# Patient Record
Sex: FEMALE | Race: WHITE | NOT HISPANIC OR LATINO | Employment: UNEMPLOYED | URBAN - METROPOLITAN AREA
[De-identification: names, ages, dates, MRNs, and addresses within clinical notes are randomized per-mention and may not be internally consistent; named-entity substitution may affect disease eponyms.]

---

## 2020-01-23 ENCOUNTER — OFFICE VISIT (OUTPATIENT)
Dept: OBGYN CLINIC | Facility: CLINIC | Age: 50
End: 2020-01-23

## 2020-01-23 VITALS
HEIGHT: 68 IN | DIASTOLIC BLOOD PRESSURE: 82 MMHG | HEART RATE: 91 BPM | WEIGHT: 190 LBS | SYSTOLIC BLOOD PRESSURE: 126 MMHG | BODY MASS INDEX: 28.79 KG/M2

## 2020-01-23 DIAGNOSIS — N95.1 MENOPAUSE SYNDROME: ICD-10-CM

## 2020-01-23 DIAGNOSIS — Z12.39 BREAST CANCER SCREENING: ICD-10-CM

## 2020-01-23 DIAGNOSIS — Z01.419 WOMEN'S ANNUAL ROUTINE GYNECOLOGICAL EXAMINATION: Primary | ICD-10-CM

## 2020-01-23 PROCEDURE — 87624 HPV HI-RISK TYP POOLED RSLT: CPT | Performed by: NURSE PRACTITIONER

## 2020-01-23 PROCEDURE — 99386 PREV VISIT NEW AGE 40-64: CPT | Performed by: NURSE PRACTITIONER

## 2020-01-23 PROCEDURE — G0145 SCR C/V CYTO,THINLAYER,RESCR: HCPCS | Performed by: NURSE PRACTITIONER

## 2020-01-23 RX ORDER — CYCLOBENZAPRINE HCL 5 MG
10 TABLET ORAL 3 TIMES DAILY PRN
COMMUNITY
End: 2020-02-10 | Stop reason: SDUPTHER

## 2020-01-23 RX ORDER — ESTRADIOL 2 MG/1
2 TABLET ORAL DAILY
COMMUNITY
End: 2020-01-23 | Stop reason: SDUPTHER

## 2020-01-23 RX ORDER — ESTRADIOL 2 MG/1
2 TABLET ORAL DAILY
Qty: 30 TABLET | Refills: 0 | Status: SHIPPED | OUTPATIENT
Start: 2020-01-23 | End: 2020-03-02 | Stop reason: SDUPTHER

## 2020-01-23 RX ORDER — WARFARIN SODIUM 3 MG/1
TABLET ORAL
COMMUNITY
End: 2020-03-17 | Stop reason: SDUPTHER

## 2020-01-23 RX ORDER — POTASSIUM CHLORIDE 7.45 MG/ML
10 INJECTION INTRAVENOUS ONCE
COMMUNITY
End: 2020-02-10 | Stop reason: CLARIF

## 2020-01-23 RX ORDER — PRAVASTATIN SODIUM 40 MG
40 TABLET ORAL DAILY
COMMUNITY
End: 2020-03-06 | Stop reason: SINTOL

## 2020-01-23 RX ORDER — CARVEDILOL 3.12 MG/1
3.12 TABLET ORAL 2 TIMES DAILY WITH MEALS
COMMUNITY
End: 2020-02-10 | Stop reason: SDUPTHER

## 2020-01-23 RX ORDER — FUROSEMIDE 40 MG/1
40 TABLET ORAL 2 TIMES DAILY
COMMUNITY
End: 2020-02-10 | Stop reason: SDUPTHER

## 2020-01-23 NOTE — PATIENT INSTRUCTIONS
Mammogram   WHAT YOU NEED TO KNOW:   What do I need to know about a mammogram?  A mammogram is an x-ray of your breasts to screen for breast cancer  Experts recommend mammograms every 2 years starting at age 48 years  You may need a mammogram at age 52 years or younger if you have an increased risk for breast cancer  Talk to your healthcare provider about when you should start having mammograms and how often you need them  How do I prepare for a mammogram?   · Do not use deodorant, powder, lotion, or perfume  These products may cause particles to appear on your mammogram      · Wear a 2-piece outfit  · If your breasts are tender before your monthly period, do not have a mammogram during this time  Schedule your mammogram to be done 1 week after your period ends  · If you are breastfeeding, express as much milk as possible before the mammogram     · Bring a list of the dates and places of your past mammograms and other breast tests or treatments  What will happen during a mammogram?  A top view and a side view x-ray are usually done for each breast  Tell healthcare providers if you have breast implants or breast problems before you have your mammogram  You may need extra x-rays of each breast   · You will be given a hospital gown  Take off your clothes from the waist up  Wear the hospital gown so that it opens in the front  · You will sit or stand next to a small x-ray machine  The healthcare provider will help you place one of your breasts on the x-ray plate  Your arm and breast will be moved until your breast is in the correct position  · Your breast will be gently pressed between 2 plastic plates for a few seconds while the x-ray is taken  This may be uncomfortable  · You will be asked to hold your breath while the x-ray is taken  Another x-ray will be taken of the same breast after the position of the x-ray machine has been changed  · Your other breast will be x-rayed the same way    What will happen after my mammogram?  Your breasts may feel tender for a short while after the mammogram  You may resume your regular activities  Ask your healthcare provider when you should receive the results of your mammogram   What are the risks of a mammogram?  You will be exposed to a small amount of radiation  Some breast cancers may not show up on mammograms  When should I contact my healthcare provider? · You cannot make your appointment on time  · You do not receive your results when expected  · You have questions or concerns about the mammogram   CARE AGREEMENT:   You have the right to help plan your care  Learn about your health condition and how it may be treated  Discuss treatment options with your caregivers to decide what care you want to receive  You always have the right to refuse treatment  The above information is an  only  It is not intended as medical advice for individual conditions or treatments  Talk to your doctor, nurse or pharmacist before following any medical regimen to see if it is safe and effective for you  © 2017 [facility] Information is for End User's use only and may not be sold, redistributed or otherwise used for commercial purposes  All illustrations and images included in CareNotes® are the copyrighted property of [employer]  or Ezequiel Helm  Wellness Visit for Adults   WHAT YOU NEED TO KNOW:   What is a wellness visit? A wellness visit is when you see your healthcare provider to get screened for health problems  You can also get advice on how to stay healthy  Write down your questions so you remember to ask them  Ask your healthcare provider how often you should have a wellness visit  What happens at a wellness visit? Your healthcare provider will ask about your health, and your family history of health problems  This includes high blood pressure, heart disease, and cancer   He or she will ask if you have symptoms that concern you, if you smoke, and about your mood  You may also be asked about your intake of medicines, supplements, food, and alcohol  Any of the following may be done:  · Your weight  will be checked  Your height may also be checked so your body mass index (BMI) can be calculated  Your BMI shows if you are at a healthy weight  · Your blood pressure  and heart rate will be checked  Your temperature may also be checked  · Blood and urine tests  may be done  Blood tests may be done to check your cholesterol levels  Abnormal cholesterol levels increase your risk for heart disease and stroke  You may also need a blood or urine test to check for diabetes if you are at increased risk  Urine tests may be done to look for signs of an infection or kidney disease  · A physical exam  includes checking your heartbeat and lungs with a stethoscope  Your healthcare provider may also check your skin to look for sun damage  · Screening tests  may be recommended  A screening test is done to check for diseases that may not cause symptoms  The screening tests you may need depend on your age, gender, family history, and lifestyle habits  For example, colorectal screening may be recommended if you are 48years old or older  What screening tests do I need if I am a woman? · A Pap smear  is used to screen for cervical cancer  Pap smears are usually done every 3 to 5 years depending on your age  You may need them more often if you have had abnormal Pap smear test results in the past  Ask your healthcare provider how often you should have a Pap smear  · A mammogram  is an x-ray of your breasts to screen for breast cancer  Experts recommend mammograms every 2 years starting at age 48 years  You may need a mammogram at age 52 years or younger if you have an increased risk for breast cancer  Talk to your healthcare provider about when you should start having mammograms and how often you need them    What vaccines might I need? · Get an influenza vaccine  every year  The influenza vaccine protects you from the flu  Several types of viruses cause the flu  The viruses change over time, so new vaccines are made each year  · Get a tetanus-diphtheria (Td) booster vaccine  every 10 years  This vaccine protects you against tetanus and diphtheria  Tetanus is a severe infection that may cause painful muscle spasms and lockjaw  Diphtheria is a severe bacterial infection that causes a thick covering in the back of your mouth and throat  · Get a human papillomavirus (HPV) vaccine  if you are female and aged 23 to 32 or male 23 to 24 and never received it  This vaccine protects you from HPV infection  HPV is the most common infection spread by sexual contact  HPV may also cause vaginal, penile, and anal cancers  · Get a pneumococcal vaccine  if you are aged 72 years or older  The pneumococcal vaccine is an injection given to protect you from pneumococcal disease  Pneumococcal disease is an infection caused by pneumococcal bacteria  The infection may cause pneumonia, meningitis, or an ear infection  · Get a shingles vaccine  if you are aged 61 or older, even if you have had shingles before  The shingles vaccine is an injection to protect you from the varicella-zoster virus  This is the same virus that causes chickenpox  Shingles is a painful rash that develops in people who had chickenpox or have been exposed to the virus  How can I eat healthy? My Plate is a model for planning healthy meals  It shows the types and amounts of foods that should go on your plate  Fruits and vegetables make up about half of your plate, and grains and protein make up the other half  A serving of dairy is included on the side of your plate  The amount of calories and serving sizes you need depends on your age, gender, weight, and height   Examples of healthy foods are listed below:  · Eat a variety of vegetables  such as dark green, red, and orange vegetables  You can also include canned vegetables low in sodium (salt) and frozen vegetables without added butter or sauces  · Eat a variety of fresh fruits , canned fruit in 100% juice, frozen fruit, and dried fruit  · Include whole grains  At least half of the grains you eat should be whole grains  Examples include whole-wheat bread, wheat pasta, brown rice, and whole-grain cereals such as oatmeal     · Eat a variety of protein foods such as seafood (fish and shellfish), lean meat, and poultry without skin (turkey and chicken)  Examples of lean meats include pork leg, shoulder, or tenderloin, and beef round, sirloin, tenderloin, and extra lean ground beef  Other protein foods include eggs and egg substitutes, beans, peas, soy products, nuts, and seeds  · Choose low-fat dairy products such as skim or 1% milk or low-fat yogurt, cheese, and cottage cheese  · Limit unhealthy fats  such as butter, hard margarine, and shortening  How much exercise do I need? Exercise at least 30 minutes per day on most days of the week  Some examples of exercise include walking, biking, dancing, and swimming  You can also fit in more physical activity by taking the stairs instead of the elevator or parking farther away from stores  Include muscle strengthening activities 2 days each week  Regular exercise provides many health benefits  It helps you manage your weight, and decreases your risk for type 2 diabetes, heart disease, stroke, and high blood pressure  Exercise can also help improve your mood  Ask your healthcare provider about the best exercise plan for you  What are some general health and safety guidelines I should follow? · Do not smoke  Nicotine and other chemicals in cigarettes and cigars can cause lung damage  Ask your healthcare provider for information if you currently smoke and need help to quit  E-cigarettes or smokeless tobacco still contain nicotine   Talk to your healthcare provider before you use these products  · Limit alcohol  A drink of alcohol is 12 ounces of beer, 5 ounces of wine, or 1½ ounces of liquor  · Lose weight, if needed  Being overweight increases your risk of certain health conditions  These include heart disease, high blood pressure, type 2 diabetes, and certain types of cancer  · Protect your skin  Do not sunbathe or use tanning beds  Use sunscreen with a SPF 15 or higher  Apply sunscreen at least 15 minutes before you go outside  Reapply sunscreen every 2 hours  Wear protective clothing, hats, and sunglasses when you are outside  · Drive safely  Always wear your seatbelt  Make sure everyone in your car wears a seatbelt  A seatbelt can save your life if you are in an accident  Do not use your cell phone when you are driving  This could distract you and cause an accident  Pull over if you need to make a call or send a text message  · Practice safe sex  Use latex condoms if are sexually active and have more than one partner  Your healthcare provider may recommend screening tests for sexually transmitted infections (STIs)  · Wear helmets, lifejackets, and protective gear  Always wear a helmet when you ride a bike or motorcycle, go skiing, or play sports that could cause a head injury  Wear protective equipment when you play sports  Wear a lifejacket when you are on a boat or doing water sports  CARE AGREEMENT:   You have the right to help plan your care  Learn about your health condition and how it may be treated  Discuss treatment options with your caregivers to decide what care you want to receive  You always have the right to refuse treatment  The above information is an  only  It is not intended as medical advice for individual conditions or treatments  Talk to your doctor, nurse or pharmacist before following any medical regimen to see if it is safe and effective for you    © 2017 [address] [employer] Information is for End User's use only and may not be sold, redistributed or otherwise used for commercial purposes  All illustrations and images included in CareNotes® are the copyrighted property of [employer]  or Ezequiel Helm  Breast Self Exam for Women   WHAT YOU NEED TO KNOW:   What is a breast self-exam (BSE)? A BSE is a way to check your breasts for lumps and other changes  Regular BSEs can help you know how your breasts normally look and feel  Most breast lumps or changes are not cancer, but you should always have them checked by a healthcare provider  Your healthcare provider can also watch you do a BSE and can tell you if you are doing your BSE correctly  Why should I do a BSE? Breast cancer is the most common type of cancer in women  Even if you have mammograms, you may still want to do a BSE regularly  If you know how your breasts normally feel and look, it may help you know when to contact your healthcare provider  Mammograms can miss some cancers  You may find a lump during a BSE that did not show up on your mammogram   When should I do a BSE? Edgar your calendar to help you remember to do BSE on a regular schedule  One easy way to remember to do a BSE is to do the exam on the same day of each month  If you have periods, you may want to do your BSE 1 week after your period ends  This is the time when your breasts may be the least swollen, lumpy, or tender  You can do regular BSEs even if you are breastfeeding or have breast implants  How should I do a BSE? · Look at your breasts in a mirror  Look at the size and shape of each breast and nipple  Check for swelling, lumps, dimpling, scaly skin, or other skin changes  Look for nipple changes, such as a nipple that is painful or beginning to pull inward  Gently squeeze both nipples and check to see if fluid (that is not breast milk) comes out of them  If you find any of these or other breast changes, contact your healthcare provider   Check your breasts while you sit or  the following 3 positions:    [facility] your arms down at your sides  ¨ Raise your hands and join them behind your head  ¨ Put firm pressure with your hands on your hips  Bend slightly forward while you look at your breasts in the mirror  · Lie down and feel your breasts  When you lie down, your breast tissue spreads out evenly over your chest  This makes it easier for you to feel for lumps and anything that may not be normal for your breasts  Do a BSE on one breast at a time  ¨ Place a small pillow or towel under your left shoulder  Put your left arm behind your head  ¨ Use the 3 middle fingers of your right hand  Use your fingertip pads, on the top of your fingers  Your fingertip pad is the most sensitive part of your finger  ¨ Use small circles to feel your breast tissue  Use your fingertip pads to make dime-sized, overlapping circles on your breast and armpits  Use light, medium, and firm pressure  First, press lightly  Second, press with medium pressure to feel a little deeper into the breast  Last, use firm pressure to feel deep within your breast     ¨ Examine your entire breast area  Examine the breast area from above the breast to below the breast where you feel only ribs  Make small circles with your fingertips, starting in the middle of your armpit  Make circles going up and down the breast area  Continue toward your breast and all the way across it  Examine the area from your armpit all the way over to the middle of your chest (breastbone)  Stop at the middle of your chest     ¨ Move the pillow or towel to your right shoulder, and put your right arm behind your head  Use the 3 fingertip pads of your left hand, and repeat the above steps to do a BSE on your right breast        What else can I do to check for breast problems or cancer? Some experts suggest that women 36years of age or older should have a mammogram every year   Other experts suggest that women between the ages of 48and 76years old should have a mammogram every 2 years  Talk to your healthcare provider about when you should have a mammogram   When should I contact my healthcare provider? · You find any lumps or changes in your breasts  · You have breast pain or fluid coming from your nipples  · You have questions or concerns or concerns about your condition or care  CARE AGREEMENT:   You have the right to help plan your care  Learn about your health condition and how it may be treated  Discuss treatment options with your caregivers to decide what care you want to receive  You always have the right to refuse treatment  The above information is an  only  It is not intended as medical advice for individual conditions or treatments  Talk to your doctor, nurse or pharmacist before following any medical regimen to see if it is safe and effective for you  © 2017 [address] [employer] Information is for End User's use only and may not be sold, redistributed or otherwise used for commercial purposes  All illustrations and images included in CareNotes® are the copyrighted property of A [employer] A M [employer] Inc  or Ezequiel Helm

## 2020-01-23 NOTE — PROGRESS NOTES
Subjective      Shani Malik is a 52 y o  female who presents for annual exam   Status post hysterectomy 2016  Patient states she had hysterectomy for cancer unsure if it was for uterine or cervical cancer  Last mammogram 2018  Denies history of abnormal mammograms  Mammogram ordered today  The patient has no complaints today  The patient is not currently sexually active  The patient is taking hormone replacement therapy  Patient denies post-menopausal vaginal bleeding    The patient wears seatbelts: yes  The patient participates in regular exercise: no  The patient reports that there is not domestic violence in her life  Menstrual History:  OB History        4    Para   4    Term   4            AB        Living   4       SAB        TAB        Ectopic        Multiple        Live Births                    Menarche age: 15  No LMP recorded  Patient has had a hysterectomy  Period Cycle (Days): (hysterectomy 2016 abnormal bleeding, fibroids )    The following portions of the patient's history were reviewed and updated as appropriate: allergies, current medications, past family history, past medical history, past social history, past surgical history and problem list     Review of Systems  Pertinent items are noted in HPI       Objective      /82   Pulse 91   Ht 5' 8" (1 727 m)   Wt 86 2 kg (190 lb)   BMI 28 89 kg/m²     General:   alert and oriented, in no acute distress and alert   Heart: regular rate and rhythm, S1, S2 normal, no murmur, click, rub or gallop   Lungs: clear to auscultation bilaterally   Abdomen: soft, non-tender, without masses or organomegaly, nondistended and normal bowel sounds   Vulva: normal, Bartholin's, Urethra, [place]'s normal, female escutcheon   Vagina: normal mucosa, normal discharge, no palpable nodules   Cervix: surgically absent   Uterus: surgically absent   Adnexa: no mass, fullness, tenderness   Breast: non-tender, no palpable masses, no nipple discharge, no skin changes bilaterally     Assessment/Plan     Diagnoses and all orders for this visit:    Women's annual routine gynecological examination  -     Liquid-based pap, screening    Breast cancer screening  -     Mammo screening bilateral w 3d & cad; Future    Menopause syndrome  -     estradiol (ESTRACE) 2 MG tablet; Take 1 tablet (2 mg total) by mouth daily    Other orders  -     Discontinue: estradiol (ESTRACE) 2 MG tablet; Take 2 mg by mouth daily  -     pravastatin (PRAVACHOL) 40 mg tablet; Take 40 mg by mouth daily  -     cyclobenzaprine (FLEXERIL) 5 mg tablet; Take 10 mg by mouth 3 (three) times a day as needed for muscle spasms  -     carvedilol (COREG) 3 125 mg tablet; Take 3 125 mg by mouth 2 (two) times a day with meals  -     potassium chloride 10 mEq; Infuse 10 mEq into a venous catheter once  -     furosemide (LASIX) 40 mg tablet; Take 40 mg by mouth 2 (two) times a day  -     warfarin (COUMADIN) 3 mg tablet; Take by mouth daily        Encouraged healthy diet, exercise and lifestyle  Encouraged smoking cessation  Reviewed with patient will only refill estradiol for 1 month until we receive clarification regarding hysterectomy  Reviewed with patient if hysterectomy was indeed done for cancer will not fill estradiol will discuss other options for treatment of menopausal symptoms  Patient states she is not interested in any other medications  Will call with results    Care gap-     --records requested for clarification of hysterectomy 8/2016     VBI-  Tobacco Cessation Counseling: Tobacco cessation counseling and education was provided  The patient is sincerely urged to quit consumption of tobacco  She is not ready to quit tobacco  The numerous health risks of tobacco consumption were discussed     BMI Counseling: Body mass index is 28 89 kg/m²   The BMI is above normal  Nutrition recommendations include reducing portion sizes, 3-5 servings of fruits/vegetables daily and reducing fast food intake  Exercise recommendations include moderate aerobic physical activity for 150 minutes/week, exercising 3-5 times per week and strength training exercises

## 2020-01-24 LAB
HPV HR 12 DNA CVX QL NAA+PROBE: NEGATIVE
HPV16 DNA CVX QL NAA+PROBE: NEGATIVE
HPV18 DNA CVX QL NAA+PROBE: NEGATIVE

## 2020-01-27 ENCOUNTER — VBI (OUTPATIENT)
Dept: OBGYN CLINIC | Facility: CLINIC | Age: 50
End: 2020-01-27

## 2020-01-27 NOTE — LETTER
Procedure Request Form: Hysterectomy Report      Date Requested: 20  Patient: Bartholome Began  Patient : 1970   Referring Provider: Dr García Coral, MD      Date of Procedure ______________________________       The above patient has informed us that they have had a hysterectomy performed  at your facility  Please complete   this form and attach all corresponding procedure reports/results  Comments __________________________________________________________  ____________________________________________________________________  ____________________________________________________________________  ____________________________________________________________________    Facility Completing Procedure _________________________________________    Form Completed By (print name) _______________________________________      Signature __________________________________________________________      These reports are needed for  compliance    Please fax this completed form and a copy of the procedure report to our office as soon as possible to [phone] gustabo Morillo: Phone [phone]    We thank you for your assistance in treating our mutual patient     (sent to [facility])

## 2020-01-27 NOTE — TELEPHONE ENCOUNTER
Emerson Segura, [address] [place] [address]             01/23/20 6:11 PM     Hello, our patient Jennifer Salvador has had hysterectomy by Dr iVlma Ramires in [place] or [address] [place] [address] possibly called [facility] date of service near 8/2016         Thank you,   Clint Huerta 6      Could not locate a signed release of information authorization in chart  Search yeilded; No results for Dr Vilma Ramires ; [facility]  Call [phone]  01/27/2020 10:10 AM Phone Swathi Monet at [facility] (Provider) [phone] Remove   Call Complete - Must request all medical records via mail or fax  Fax number [phone]        By Nichole Villa MA        Faxed to [facility] [phone] JMPRBYRU XCQMZ, MA01/27/20 10:17 AM     Received LAURA surgical report, Nichole Villa MA 01/29/20 12:03 PM

## 2020-01-29 LAB
LAB AP GYN PRIMARY INTERPRETATION: NORMAL
Lab: NORMAL

## 2020-02-10 ENCOUNTER — OFFICE VISIT (OUTPATIENT)
Dept: FAMILY MEDICINE CLINIC | Facility: CLINIC | Age: 50
End: 2020-02-10

## 2020-02-10 ENCOUNTER — HOSPITAL ENCOUNTER (OUTPATIENT)
Dept: RADIOLOGY | Facility: HOSPITAL | Age: 50
Discharge: HOME | End: 2020-02-10
Payer: COMMERCIAL

## 2020-02-10 ENCOUNTER — TRANSCRIBE ORDERS (OUTPATIENT)
Dept: LAB | Facility: CLINIC | Age: 50
End: 2020-02-10

## 2020-02-10 VITALS
DIASTOLIC BLOOD PRESSURE: 80 MMHG | HEART RATE: 78 BPM | WEIGHT: 194 LBS | TEMPERATURE: 97.8 F | BODY MASS INDEX: 27.77 KG/M2 | RESPIRATION RATE: 18 BRPM | SYSTOLIC BLOOD PRESSURE: 130 MMHG | HEIGHT: 70 IN

## 2020-02-10 DIAGNOSIS — Z95.2 H/O MITRAL VALVE REPLACEMENT WITH MECHANICAL VALVE: ICD-10-CM

## 2020-02-10 DIAGNOSIS — M54.2 ACUTE NECK PAIN: ICD-10-CM

## 2020-02-10 DIAGNOSIS — Z00.00 ANNUAL PHYSICAL EXAM: Primary | ICD-10-CM

## 2020-02-10 DIAGNOSIS — G93.0 BRAIN CYST: ICD-10-CM

## 2020-02-10 DIAGNOSIS — J44.9 CHRONIC OBSTRUCTIVE PULMONARY DISEASE, UNSPECIFIED COPD TYPE (HCC): ICD-10-CM

## 2020-02-10 DIAGNOSIS — E78.49 OTHER HYPERLIPIDEMIA: ICD-10-CM

## 2020-02-10 DIAGNOSIS — Z11.4 SCREENING FOR HIV (HUMAN IMMUNODEFICIENCY VIRUS): ICD-10-CM

## 2020-02-10 PROBLEM — G43.109 MIGRAINE WITH AURA AND WITHOUT STATUS MIGRAINOSUS, NOT INTRACTABLE: Status: ACTIVE | Noted: 2020-02-10

## 2020-02-10 PROCEDURE — 99386 PREV VISIT NEW AGE 40-64: CPT | Performed by: FAMILY MEDICINE

## 2020-02-10 PROCEDURE — 72050 X-RAY EXAM NECK SPINE 4/5VWS: CPT

## 2020-02-10 RX ORDER — CYCLOBENZAPRINE HCL 5 MG
10 TABLET ORAL 3 TIMES DAILY PRN
Qty: 90 TABLET | Refills: 1 | Status: SHIPPED | OUTPATIENT
Start: 2020-02-10

## 2020-02-10 RX ORDER — POTASSIUM CHLORIDE 750 MG/1
10 CAPSULE, EXTENDED RELEASE ORAL DAILY
Qty: 30 CAPSULE | Refills: 3 | Status: SHIPPED | OUTPATIENT
Start: 2020-02-10

## 2020-02-10 RX ORDER — ASPIRIN 81 MG/1
81 TABLET, CHEWABLE ORAL DAILY
COMMUNITY

## 2020-02-10 RX ORDER — CARVEDILOL 3.12 MG/1
3.12 TABLET ORAL 2 TIMES DAILY WITH MEALS
Qty: 60 TABLET | Refills: 3 | Status: SHIPPED | OUTPATIENT
Start: 2020-02-10

## 2020-02-10 RX ORDER — FUROSEMIDE 40 MG/1
40 TABLET ORAL 2 TIMES DAILY
Qty: 60 TABLET | Refills: 3 | Status: SHIPPED | OUTPATIENT
Start: 2020-02-10

## 2020-02-10 NOTE — ASSESSMENT & PLAN NOTE
· Replacement occurred on 10/12/2018  · Surgery occurred in [place]  · According to patient will need lifelong anticoagulation  · Currently on aspirin 81mg daily and coumadin 3mg 6 days a week  · INR most recently 2 7 on 12/13/2019  · Patient also has history of orthopnea and is currently on lasix 40mg bid  · No available echo results  · Repeat INR and referral for cardiology given  · Patient will need to sign record release form to obtain previous records if unable to obtain will repeat echo to evaluate for EF and valve status

## 2020-02-10 NOTE — PATIENT INSTRUCTIONS

## 2020-02-10 NOTE — PROGRESS NOTES
[facility]    NAME: Minh Zuleta  AGE: 52 y o  SEX: female  : 1970     DATE: 2020     Assessment and Plan:     Problem List Items Addressed This Visit        Respiratory    COPD (chronic obstructive pulmonary disease) ([address] )     Current daily 1/2 pack smoker  Denies recent exacerbations  Previously diagnosed in [place]  No available documentation or medical records  RTC for PFT testing with consideration of referral to pulmonology  Nervous and Auditory    Brain cyst     · Followed with neurology back in [place]  · Found to have brain cyst that has been stable  · Transient episodes of cervical muscles spasms down left arm, left sided facial numbness without droop, and left upper and lower extremity paralysis  · Spasms last for about 15-20 minutes  · Takes flexeril 10mg TID which helps  · Patient states a history ofC4-5 disc herniation with MRI's performed in [place]  · Repeat cervical XR  · Referral for neurology given to establish care  · Will need records released from [place] for further information            Other    H/O mitral valve replacement with mechanical valve     · Replacement occurred on 10/12/2018  · Surgery occurred in [place]  · According to patient will need lifelong anticoagulation  · Currently on aspirin 81mg daily and coumadin 3mg 6 days a week  · INR most recently 2 7 on 2019  · Patient also has history of orthopnea and is currently on lasix 40mg bid  · No available echo results  · Repeat INR and referral for cardiology given  · Patient will need to sign record release form to obtain previous records if unable to obtain will repeat echo to evaluate for EF and valve status           Relevant Medications    carvedilol (COREG) 3 125 mg tablet    furosemide (LASIX) 40 mg tablet    potassium chloride (MICRO-K) 10 MEQ CR capsule    Other Relevant Orders    Protime-INR    Ambulatory referral to Cardiology    Other hyperlipidemia     Currently on Pravastatin 40mg daily  No recent records of lipid panel available           Relevant Orders    Lipid panel    Comprehensive metabolic panel      Other Visit Diagnoses     Annual physical exam    -  Primary    Screening for HIV (human immunodeficiency virus)        Relevant Orders    HIV 1/2 AG-AB combo    Acute neck pain        Relevant Medications    cyclobenzaprine (FLEXERIL) 5 mg tablet    Other Relevant Orders    Ambulatory referral to Neurology    XR spine cervical complete 4 or 5 vw non injury          Immunizations and preventive care screenings were discussed with patient today  Appropriate education was printed on patient's after visit summary  Counseling:  Alcohol/drug use: discussed moderation in alcohol intake, the recommendations for healthy alcohol use, and avoidance of illicit drug use  Dental Health: discussed importance of regular tooth brushing, flossing, and dental visits  Injury prevention: discussed safety/seat belts, safety helmets, smoke detectors, carbon dioxide detectors, and smoking near bedding or upholstery  Sexual health: discussed sexually transmitted diseases, partner selection, use of condoms, avoidance of unintended pregnancy, and contraceptive alternatives  · Exercise: the importance of regular exercise/physical activity was discussed  Recommend exercise 3-5 times per week for at least 30 minutes  Return in about 1 week (around 2/17/2020) for follow up after Xray of neck  Chief Complaint:     Chief Complaint   Patient presents with    Establish Care      History of Present Illness:     Adult Annual Physical   Patient here for a comprehensive physical exam  The patient reports moving here from [place] who does not have insurance and is establishing care   She has an extensive cardiac history and neurologic    Diet and Physical Activity  · Diet/Nutrition: well balanced diet and consuming 3-5 servings of fruits/vegetables daily  · Exercise: walking  Depression Screening  PHQ-9 Depression Screening    PHQ-9:    Frequency of the following problems over the past two weeks:            General Health  · Sleep: sleeps poorly and 2/2 pain  · Hearing: normal - bilateral   · Vision: wears glasses  · Dental: has no teeth and wears dentures         /GYN Health  · Patient is: postmenopausal  · S/p hysterectomy 8/2016     Review of Systems:     Review of Systems   Past Medical History:     Past Medical History:   Diagnosis Date    Abnormal Pap smear of cervix     Abnormal uterine bleeding     fibroids     ADD (attention deficit disorder)     Cancer (HCC)     possible uterine / cervical cancer     COPD (chronic obstructive pulmonary disease) (HCC)     Depression     HPV (human papilloma virus) infection     Hypertension     Migraine       Past Surgical History:     Past Surgical History:   Procedure Laterality Date    CARDIAC SURGERY      mitrovalve replacement 10/2018    HYSTERECTOMY      2016    TONSILLECTOMY      TUBAL LIGATION        Social History:     Social History     Socioeconomic History    Marital status: /Civil Union     Spouse name: Not on file    Number of children: Not on file    Years of education: Not on file    Highest education level: Not on file   Occupational History    Not on file   Social Needs    Financial resource strain: Not on file    Food insecurity:     Worry: Not on file     Inability: Not on file    Transportation needs:     Medical: Not on file     Non-medical: Not on file   Tobacco Use    Smoking status: Current Every Day Smoker    Smokeless tobacco: Never Used    Tobacco comment: 1/2 - 1 pk daily   Substance and Sexual Activity    Alcohol use: Yes     Frequency: 2-3 times a week     Drinks per session: 1 or 2     Binge frequency: Never     Comment: social     Drug use: Never    Sexual activity: Not Currently     Partners: Male     Birth control/protection: Surgical   Lifestyle    Physical activity:     Days per week: Not on file     Minutes per session: Not on file    Stress: Not on file   Relationships    Social connections:     Talks on phone: Not on file     Gets together: Not on file     Attends Zoroastrian service: Not on file     Active member of club or organization: Not on file     Attends meetings of clubs or organizations: Not on file     Relationship status: Not on file    Intimate partner violence:     Fear of current or ex partner: Not on file     Emotionally abused: Not on file     Physically abused: Not on file     Forced sexual activity: Not on file   Other Topics Concern    Not on file   Social History Narrative    Not on file      Family History:     Family History   Problem Relation Age of Onset    Diabetes Mother     Hypertension Mother     Heart disease Mother     Migraines Mother     Pancreatic cancer Father     Thyroid disease Sister     ADD / ADHD Daughter     Learning disabilities Daughter     Growth hormone deficiency Son    [facility] Migraines Son     Migraines Daughter     No Known Problems Son       Current Medications:     Current Outpatient Medications   Medication Sig Dispense Refill    aspirin (ASPIRIN 81) 81 mg chewable tablet Chew 81 mg daily      carvedilol (COREG) 3 125 mg tablet Take 1 tablet (3 125 mg total) by mouth 2 (two) times a day with meals 60 tablet 3    cyclobenzaprine (FLEXERIL) 5 mg tablet Take 2 tablets (10 mg total) by mouth 3 (three) times a day as needed for muscle spasms 90 tablet 1    estradiol (ESTRACE) 2 MG tablet Take 1 tablet (2 mg total) by mouth daily 30 tablet 0    furosemide (LASIX) 40 mg tablet Take 1 tablet (40 mg total) by mouth 2 (two) times a day 60 tablet 3    pravastatin (PRAVACHOL) 40 mg tablet Take 40 mg by mouth daily      warfarin (COUMADIN) 3 mg tablet Take by mouth daily      potassium chloride (MICRO-K) 10 MEQ CR capsule Take 1 capsule (10 mEq total) by mouth daily 30 capsule 3     No current facility-administered medications for this visit  Allergies: Allergies   Allergen Reactions    Sulfa Antibiotics Swelling     Hives      Physical Exam:     /80   Pulse 78   Temp 97 8 °F (36 6 °C)   Resp 18   Ht 5' 10 2" (1 783 m)   Wt 88 kg (194 lb)   BMI 27 68 kg/m²     Physical Exam   Constitutional: She is oriented to person, place, and time  She appears well-developed and well-nourished  No distress  HENT:   Head: Normocephalic and atraumatic  Right Ear: External ear normal    Left Ear: External ear normal    Nose: Nose normal    Mouth/Throat: Oropharynx is clear and moist  No oropharyngeal exudate  Eyes: Pupils are equal, round, and reactive to light  Conjunctivae are normal  Right eye exhibits no discharge  Left eye exhibits no discharge  No scleral icterus  Neck: Normal range of motion  Neck supple  No JVD present  No tracheal deviation present  No thyromegaly present  Cardiovascular: Normal rate, regular rhythm, normal heart sounds and intact distal pulses  Exam reveals no gallop and no friction rub  No murmur heard  Pulmonary/Chest: Effort normal and breath sounds normal  No respiratory distress  She has no wheezes  She has no rales  She exhibits no tenderness  Abdominal: Soft  Bowel sounds are normal  She exhibits no distension  There is no tenderness  There is no rebound and no guarding  Musculoskeletal: She exhibits no edema  Cervical back: She exhibits decreased range of motion and spasm  She exhibits no bony tenderness, no swelling, no edema and no deformity  Spasm of trapezius b/l and paracervical muscle tenderness   Neurological: She is alert and oriented to person, place, and time  No cranial nerve deficit  Coordination normal    Skin: Skin is warm and dry  She is not diaphoretic  Vitals reviewed        Adeel Warren DO  [facility]

## 2020-02-10 NOTE — ASSESSMENT & PLAN NOTE
· Followed with neurology back in [place]  · Found to have brain cyst that has been stable  · Transient episodes of cervical muscles spasms down left arm, left sided facial numbness without droop, and left upper and lower extremity paralysis  · Spasms last for about 15-20 minutes  · Takes flexeril 10mg TID which helps  · Patient states a history ofC4-5 disc herniation with MRI's performed in [place]  · Repeat cervical XR  · Referral for neurology given to establish care  · Will need records released from [place] for further information

## 2020-02-11 NOTE — ASSESSMENT & PLAN NOTE
Current daily 1/2 pack smoker  Denies recent exacerbations  Previously diagnosed in [place]  No available documentation or medical records  RTC for PFT testing with consideration of referral to pulmonology

## 2020-02-12 ENCOUNTER — APPOINTMENT (OUTPATIENT)
Dept: LAB | Facility: CLINIC | Age: 50
End: 2020-02-12

## 2020-02-12 DIAGNOSIS — Z95.2 H/O MITRAL VALVE REPLACEMENT WITH MECHANICAL VALVE: ICD-10-CM

## 2020-02-12 DIAGNOSIS — Z11.4 SCREENING FOR HIV (HUMAN IMMUNODEFICIENCY VIRUS): ICD-10-CM

## 2020-02-12 DIAGNOSIS — E78.49 OTHER HYPERLIPIDEMIA: ICD-10-CM

## 2020-02-12 LAB
ALBUMIN SERPL BCP-MCNC: 3 G/DL (ref 3.5–5)
ALP SERPL-CCNC: 125 U/L (ref 46–116)
ALT SERPL W P-5'-P-CCNC: 140 U/L (ref 12–78)
ANION GAP SERPL CALCULATED.3IONS-SCNC: 11 MMOL/L (ref 4–13)
AST SERPL W P-5'-P-CCNC: 55 U/L (ref 5–45)
BILIRUB SERPL-MCNC: 0.25 MG/DL (ref 0.2–1)
BUN SERPL-MCNC: 14 MG/DL (ref 5–25)
CALCIUM SERPL-MCNC: 8.4 MG/DL (ref 8.3–10.1)
CHLORIDE SERPL-SCNC: 105 MMOL/L (ref 100–108)
CHOLEST SERPL-MCNC: 391 MG/DL (ref 50–200)
CO2 SERPL-SCNC: 25 MMOL/L (ref 21–32)
CREAT SERPL-MCNC: 0.97 MG/DL (ref 0.6–1.3)
GFR SERPL CREATININE-BSD FRML MDRD: 69 ML/MIN/1.73SQ M
GLUCOSE P FAST SERPL-MCNC: 89 MG/DL (ref 65–99)
HDLC SERPL-MCNC: 67 MG/DL
INR PPP: 2.45 (ref 0.84–1.19)
NONHDLC SERPL-MCNC: 324 MG/DL
POTASSIUM SERPL-SCNC: 4.1 MMOL/L (ref 3.5–5.3)
PROT SERPL-MCNC: 6.7 G/DL (ref 6.4–8.2)
PROTHROMBIN TIME: 25.7 SECONDS (ref 11.6–14.5)
SODIUM SERPL-SCNC: 141 MMOL/L (ref 136–145)
TRIGL SERPL-MCNC: 477 MG/DL

## 2020-02-12 PROCEDURE — 80061 LIPID PANEL: CPT

## 2020-02-12 PROCEDURE — 36415 COLL VENOUS BLD VENIPUNCTURE: CPT

## 2020-02-12 PROCEDURE — 85610 PROTHROMBIN TIME: CPT

## 2020-02-12 PROCEDURE — 87389 HIV-1 AG W/HIV-1&-2 AB AG IA: CPT

## 2020-02-12 PROCEDURE — 80053 COMPREHEN METABOLIC PANEL: CPT

## 2020-02-13 LAB — HIV 1+2 AB+HIV1 P24 AG SERPL QL IA: NORMAL

## 2020-02-24 ENCOUNTER — OFFICE VISIT (OUTPATIENT)
Dept: FAMILY MEDICINE CLINIC | Facility: CLINIC | Age: 50
End: 2020-02-24

## 2020-02-24 ENCOUNTER — PATIENT OUTREACH (OUTPATIENT)
Dept: FAMILY MEDICINE CLINIC | Facility: CLINIC | Age: 50
End: 2020-02-24

## 2020-02-24 VITALS
WEIGHT: 197.2 LBS | TEMPERATURE: 96.4 F | HEART RATE: 82 BPM | RESPIRATION RATE: 16 BRPM | DIASTOLIC BLOOD PRESSURE: 70 MMHG | BODY MASS INDEX: 28.23 KG/M2 | SYSTOLIC BLOOD PRESSURE: 120 MMHG | HEIGHT: 70 IN

## 2020-02-24 DIAGNOSIS — Z95.2 H/O MITRAL VALVE REPLACEMENT WITH MECHANICAL VALVE: ICD-10-CM

## 2020-02-24 DIAGNOSIS — J44.9 CHRONIC OBSTRUCTIVE PULMONARY DISEASE, UNSPECIFIED COPD TYPE (HCC): ICD-10-CM

## 2020-02-24 DIAGNOSIS — E78.49 OTHER HYPERLIPIDEMIA: ICD-10-CM

## 2020-02-24 DIAGNOSIS — E66.3 OVERWEIGHT (BMI 25.0-29.9): ICD-10-CM

## 2020-02-24 DIAGNOSIS — I10 ESSENTIAL HYPERTENSION: ICD-10-CM

## 2020-02-24 DIAGNOSIS — M54.2 NECK PAIN: Primary | ICD-10-CM

## 2020-02-24 PROCEDURE — 3008F BODY MASS INDEX DOCD: CPT | Performed by: FAMILY MEDICINE

## 2020-02-24 PROCEDURE — 3078F DIAST BP <80 MM HG: CPT | Performed by: FAMILY MEDICINE

## 2020-02-24 PROCEDURE — 3074F SYST BP LT 130 MM HG: CPT | Performed by: FAMILY MEDICINE

## 2020-02-24 PROCEDURE — 99213 OFFICE O/P EST LOW 20 MIN: CPT | Performed by: FAMILY MEDICINE

## 2020-02-24 PROCEDURE — 4004F PT TOBACCO SCREEN RCVD TLK: CPT | Performed by: FAMILY MEDICINE

## 2020-02-24 NOTE — PROGRESS NOTES
Assessment/Plan:    Essential hypertension  Stable  · No history of T2DM or MI/CVA  · Not currently on medications  · Bp range 130-140/80-90  · Today bp is 120/70 at goal <140/90  · Will order microalbumin at next visit  · Counseled patient on low salt diet and DASH diet  · Increase exercise      H/O mitral valve replacement with mechanical valve  History of undiagnosed rheumatic heart disease  · Now patient has increased Orthopnea and ANDREWS  · Will order ECHO to evaluated EF and Mitral valve  · Continue lasix 40mg daily for now  · No JVD or LE edema  · Due to lack of insurance, patient unable to afford specialist visits  · Consult chronic care manager and community health worker  · CM met with patient today and paperwork given for financial assistance  · INR subtherapeutic at 2 45, will have patient take 3mg warfarin Tuesday, Wednesday, Friday, Saturday, and sundary  And 4 5 mg Monday and Thursday  · Script for repeat INR given      Neck pain  New and worsening  · Cervical XR unremarkable  · She has been using stop-pain menthol spray for muscle spasm which has stopped working  · CBD topical oil is helping  · She takes tylenol 1500mg TID and flexeril 5mg TID  · Due to elevated liver enzymes, counseled patient to only take maximum of 3000mg tylenol daily  · She has been doing shoulder exercises which help but cannot do it due to pain  · She is interested in OMT/PT and will likely provide most benefit due to insurance issues  · Follow up in 1 week for OMT      COPD (chronic obstructive pulmonary disease) ([address] [facility] [address] )  Patient states diagnosis in past but denies inhaler usage  · No recent exacerbations  · Patient has orthopnea and LE swelling  · Daily smoker 1/2 ppd smoker   · Counseled patient on tobacco cessation and patient is agreeable  · Will order echo due to suspected CHF  · If patients symptoms worsen will order PFT's in future due to current lack of insurance      Other hyperlipidemia  Worsening  · Counseled patient on weight loss with diet, exercise, and smoking cessation  · Will order TSH for weight gain  · ASCVD of 7 5%  · Although elevated cholesterol and triglycerides, patient has elevated liver enzymes  · Discussed discontinuing statin but patient is unwilling to at this time  · Patient drinks alcohol occasionally  · Will  at next appointment and repeat CMP         Problem List Items Addressed This Visit        Respiratory    COPD (chronic obstructive pulmonary disease) ([address] )     Patient states diagnosis in past but denies inhaler usage  · No recent exacerbations  · Patient has orthopnea and LE swelling  · Daily smoker 1/2 ppd smoker   · Counseled patient on tobacco cessation and patient is agreeable  · Will order echo due to suspected CHF  · If patients symptoms worsen will order PFT's in future due to current lack of insurance  Relevant Orders    Ambulatory referral to chronic care management    Ambulatory referral to community health worker       Cardiovascular and Mediastinum    Essential hypertension     Stable  · No history of T2DM or MI/CVA  · Not currently on medications  · Bp range 130-140/80-90  · Today bp is 120/70 at goal <140/90  · Will order microalbumin at next visit  · Counseled patient on low salt diet and DASH diet  · Increase exercise           Relevant Orders    Ambulatory referral to chronic care management    Ambulatory referral to community health worker       Other    H/O mitral valve replacement with mechanical valve     History of undiagnosed rheumatic heart disease  · Now patient has increased Orthopnea and ANDREWS  · Will order ECHO to evaluated EF and Mitral valve  · Continue lasix 40mg daily for now  · No JVD or LE edema  · Due to lack of insurance, patient unable to afford specialist visits  · Consult chronic care manager and community health worker  · CM met with patient today and paperwork given for financial assistance    · INR subtherapeutic at 2 45, will have patient take 3mg warfarin Tuesday, Wednesday, Friday, Saturday, and sundary  And 4 5 mg Monday and Thursday  · Script for repeat INR given           Relevant Orders    Protime-INR    Echo complete with contrast if indicated    Ambulatory referral to chronic care management    Ambulatory referral to community health worker    Other hyperlipidemia     Worsening  · Counseled patient on weight loss with diet, exercise, and smoking cessation  · Will order TSH for weight gain  · ASCVD of 7 5%  · Although elevated cholesterol and triglycerides, patient has elevated liver enzymes  · Discussed discontinuing statin but patient is unwilling to at this time  · Patient drinks alcohol occasionally  · Will  at next appointment and repeat CMP         Relevant Orders    TSH, 3rd generation with Free T4 reflex    Ambulatory referral to chronic care management    Ambulatory referral to community health worker    Neck pain - Primary     New and worsening  · Cervical XR unremarkable  · She has been using stop-pain menthol spray for muscle spasm which has stopped working  · CBD topical oil is helping  · She takes tylenol 1500mg TID and flexeril 5mg TID  · Due to elevated liver enzymes, counseled patient to only take maximum of 3000mg tylenol daily  · She has been doing shoulder exercises which help but cannot do it due to pain  · She is interested in OMT/PT and will likely provide most benefit due to insurance issues  · Follow up in 1 week for OMT             Other Visit Diagnoses     Overweight (BMI 25 0-29  9)        Relevant Orders    TSH, 3rd generation with Free T4 reflex    Ambulatory referral to community health worker            Subjective:      Patient ID: Meng Eid is a 52 y o  female  HPI  Patient is here for follow up of INR and neck pain  Patient has history of cervical muscle and trapezius spasm that she has been doing stretches, using mentho cream, and warm compresses   The symptoms are usually responsive but have recently not been helped  Cervical spine xr was unremarkable  She has also been take more than the recommended dosing of tylenol at 1500mg TID  She has been avoiding NSAIDS due to concurrent warfarin usage  She denies weakness, numbness or parasthesias  Her INR was subtherapeutic at 2 45 2 weeks ago  Increased her dosage of warfarin 3mg 5 days a week and 4 5mg every Monday and Thursday  The following portions of the patient's history were reviewed and updated as appropriate: allergies, current medications, past family history, past medical history, past social history, past surgical history and problem list     Review of Systems   Constitutional: Negative for activity change, appetite change, chills, diaphoresis and fever  Eyes: Negative for photophobia and visual disturbance  Respiratory: Positive for shortness of breath  Negative for apnea, cough, choking, chest tightness, wheezing and stridor  Cardiovascular: Negative for chest pain, palpitations and leg swelling  Gastrointestinal: Negative for abdominal distention, abdominal pain, anal bleeding, blood in stool, constipation, diarrhea, nausea and vomiting  Genitourinary: Negative for difficulty urinating, dysuria, frequency, hematuria and urgency  Musculoskeletal: Positive for neck pain  Negative for arthralgias, back pain, gait problem, joint swelling, myalgias and neck stiffness  Neurological: Negative for dizziness, syncope, weakness, light-headedness, numbness and headaches  Objective:      /70   Pulse 82   Temp (!) 96 4 °F (35 8 °C)   Resp 16   Ht 5' 10 2" (1 783 m)   Wt 89 4 kg (197 lb 3 2 oz)   BMI 28 13 kg/m²          Physical Exam   Constitutional: She is oriented to person, place, and time  She appears well-developed and well-nourished  No distress  HENT:   Head: Normocephalic and atraumatic     Right Ear: External ear normal    Left Ear: External ear normal    Nose: Nose normal    Mouth/Throat: Oropharynx is clear and moist  No oropharyngeal exudate  Eyes: Pupils are equal, round, and reactive to light  Conjunctivae are normal  Right eye exhibits no discharge  Left eye exhibits no discharge  No scleral icterus  Neck: Normal range of motion  Neck supple  No JVD present  No tracheal deviation present  No thyromegaly present  Cardiovascular: Normal rate, regular rhythm and intact distal pulses  Exam reveals no gallop and no friction rub  Murmur heard  Pulmonary/Chest: Effort normal and breath sounds normal  No respiratory distress  She has no wheezes  She has no rales  She exhibits no tenderness  Abdominal: Soft  Bowel sounds are normal  She exhibits no distension  There is no tenderness  There is no rebound and no guarding  Musculoskeletal: Normal range of motion  She exhibits no edema  Neurological: She is alert and oriented to person, place, and time  No cranial nerve deficit  Coordination normal    Skin: Skin is warm and dry  Capillary refill takes less than 2 seconds  She is not diaphoretic  Vitals reviewed

## 2020-02-24 NOTE — ASSESSMENT & PLAN NOTE
History of undiagnosed rheumatic heart disease  · Now patient has increased Orthopnea and ANDREWS  · Will order ECHO to evaluated EF and Mitral valve  · Continue lasix 40mg daily for now  · No JVD or LE edema  · Due to lack of insurance, patient unable to afford specialist visits  · Consult chronic care manager and community health worker  · CM met with patient today and paperwork given for financial assistance  · INR subtherapeutic at 2 45, will have patient take 3mg warfarin Tuesday, Wednesday, Friday, Saturday, and sundary   And 4 5 mg Monday and Thursday  · Script for repeat INR given

## 2020-02-24 NOTE — ASSESSMENT & PLAN NOTE
New and worsening  · Cervical XR unremarkable  · She has been using stop-pain menthol spray for muscle spasm which has stopped working  · CBD topical oil is helping  · She takes tylenol 1500mg TID and flexeril 5mg TID  · Due to elevated liver enzymes, counseled patient to only take maximum of 3000mg tylenol daily  · She has been doing shoulder exercises which help but cannot do it due to pain    · She is interested in OMT/PT and will likely provide most benefit due to insurance issues  · Follow up in 1 week for OMT

## 2020-02-24 NOTE — ASSESSMENT & PLAN NOTE
Stable  · No history of T2DM or MI/CVA  · Not currently on medications  · Bp range 130-140/80-90  · Today bp is 120/70 at goal <140/90  · Will order microalbumin at next visit  · Counseled patient on low salt diet and DASH diet  · Increase exercise

## 2020-02-25 NOTE — ASSESSMENT & PLAN NOTE
Worsening  · Counseled patient on weight loss with diet, exercise, and smoking cessation  · Will order TSH for weight gain  · ASCVD of 7 5%  · Although elevated cholesterol and triglycerides, patient has elevated liver enzymes  · Discussed discontinuing statin but patient is unwilling to at this time    · Patient drinks alcohol occasionally  · Will  at next appointment and repeat CMP

## 2020-02-25 NOTE — ASSESSMENT & PLAN NOTE
Patient states diagnosis in past but denies inhaler usage  · No recent exacerbations  · Patient has orthopnea and LE swelling  · Daily smoker 1/2 ppd smoker   · Counseled patient on tobacco cessation and patient is agreeable  · Will order echo due to suspected CHF  · If patients symptoms worsen will order PFT's in future due to current lack of insurance

## 2020-02-27 NOTE — PROGRESS NOTES
Outgoing call 2/27/20    CHW spoke joseph/Radha to see how she made out with the [employer] application I provided to her for her  and her for the hospital   Bard Lozano indicated she hasn't been feeling well and hasn't had a chance to speak with her  yet  CHW to reach out to Bard Lozano next week

## 2020-02-27 NOTE — PROGRESS NOTES
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 and travel to multiple states based on the location of his job  Daniel Mendoza indicated that they have been in the area since October and will be relocating maybe around June  Daniel Mendoza indicated that her spouse has been looking for permanent work in the area but hasn't been able to secure anything at this time  Daniel Mendoza spoke w/Vicki Andrade Counselor during a previous visit and Daniel Mendoza and her daughter and have been approved for [employer] Fee Program through [facility]  Daniel Mendoza indicated her spouse and her need to see other specialist within the network and have some diagnostic test completed  CHW provided Daniel Mendoza w/[employer] [facility] for [facility] for spouse and also two [employer] Applications for [facility]  CHW mentioned to Daniel Mendoza if she needed assistance completing the [employer] Application for the hospital to contact me  CHW informed Denyce Boas would  ouch base w/her on Thursday, 2/27 to see if any assistance [employer] Application is needed  Daniel Mendoza agreed

## 2020-02-28 ENCOUNTER — OFFICE VISIT (OUTPATIENT)
Dept: FAMILY MEDICINE CLINIC | Facility: CLINIC | Age: 50
End: 2020-02-28

## 2020-02-28 VITALS — HEIGHT: 70 IN | BODY MASS INDEX: 27.57 KG/M2 | WEIGHT: 192.6 LBS

## 2020-02-28 DIAGNOSIS — M99.02 SOMATIC DYSFUNCTION OF THORACIC REGION: ICD-10-CM

## 2020-02-28 DIAGNOSIS — M99.03 SOMATIC DYSFUNCTION OF LUMBAR REGION: ICD-10-CM

## 2020-02-28 DIAGNOSIS — M54.2 NECK PAIN: Primary | ICD-10-CM

## 2020-02-28 DIAGNOSIS — M99.00 SOMATIC DYSFUNCTION OF HEAD REGION: ICD-10-CM

## 2020-02-28 DIAGNOSIS — M99.01 SOMATIC DYSFUNCTION OF CERVICAL REGION: ICD-10-CM

## 2020-02-28 NOTE — PROGRESS NOTES
Assessment/Plan     This is a 52 y o  female who presents for OMT follow-up for:  1  Neck pain      patient tolerated OMT well  initial visit  will follow up in 1 week for further OMT   2  Somatic dysfunction of head region     3  Somatic dysfunction of cervical region     4  Somatic dysfunction of thoracic region     5  Somatic dysfunction of lumbar region         Plan:   1  Patient tolerated OMT well for the above problems,  advised patient to drink fluids and can use NSAID for soreness after treatment     2  OMT Follow up in 1 weeks  Subjective     Armando Flores is a 52 y o  female and is here for a OMT follow up  The patient reports right neck pain that has been ongoing for the past 3 weeks  She has a history of neck pain that usually responds well to heating pads, tylenol, icy-hot spray, and massage but nothing seems to be helping  Patient has had general neck pain for many years but this episode is worse than usual  She has some tingling down her right arm  Cervical XR was negative for fractures or spinal stenosis  Patient might benefit from EMG but she currently does not have insurance and would not be able to afford the test  She also has other medical problems that are currently being managed and need to take care of them before doing any further imaging for her neck pain  She denies weakness or paralysis of her arms  She never had OMT or PT before  Is the patient taking Pain medication? yes  Has the patient completed physical therapy for this condition?  no  Did Patient symptoms improve from last OMT appointment? initial visit    The following portions of the patient's history were reviewed and updated as appropriate: allergies, current medications, past family history, past medical history, past social history, past surgical history and problem list     Review of Systems  Do you have pain that bothers you in your daily life? yes  Review of Systems   Constitutional: Negative for chills, diaphoresis and fever  Cardiovascular: Negative for chest pain and palpitations  Musculoskeletal: Positive for back pain, neck pain and neck stiffness  Negative for arthralgias, gait problem, joint swelling and myalgias  Neurological: Positive for light-headedness and headaches  Negative for dizziness, seizures, facial asymmetry, speech difficulty and numbness         Objective     OMT Exam   Head:   Somatic Dysfunction:  Tissue Texture Changes, Asymmetry , Restriction and Tenderness  Severity :  0  Osteopathic Findings: hyperparasympathetics  Treatment Method: OA release  Response: stayed the same  Cervical:   Somatic Dysfunction:  Tissue Texture Changes, Asymmetry , Restriction and Tenderness  Severity :  1  Osteopathic Findings: Hypertonic scalenes and paraspinal musches, R PC4 tenderpoint  Treatment Method: ST, MFR and CS  Response: improved  Thoracic T1-4:   Somatic Dysfunction:  Tissue Texture Changes, Restriction and Tenderness  Severity :  1  Osteopathic Findings: paraspinal muscle hypertonicity, trapezius spasm R>L  Treatment Method: ME, ST and MFR  Response: improved  Thoracic T5-9:   Somatic Dysfunction:  Tissue Texture Changes, Asymmetry , Restriction and Tenderness  Severity :  1  Osteopathic Findings: R rhomboid spasm, paraspinal muscle hypertonicity  Treatment Method: ST  Response: stayed the same  Lumbar:  Somatic Dysfunction:  Tissue Texture Changes, Asymmetry , Restriction and Tenderness  Severity :  1  Osteopathic Findings: Quadratus lumborum spasm  Treatment Method: ME and ST  Response: stayed the same

## 2020-03-02 ENCOUNTER — TELEPHONE (OUTPATIENT)
Dept: OBGYN CLINIC | Facility: CLINIC | Age: 50
End: 2020-03-02

## 2020-03-02 DIAGNOSIS — N95.1 MENOPAUSE SYNDROME: ICD-10-CM

## 2020-03-02 RX ORDER — ESTRADIOL 2 MG/1
2 TABLET ORAL DAILY
Qty: 30 TABLET | Refills: 11 | Status: SHIPPED | OUTPATIENT
Start: 2020-03-02

## 2020-03-02 NOTE — TELEPHONE ENCOUNTER
Pt called requesting a refill for her estrace  [facility] records scanned into media on 01/29/2020

## 2020-03-04 ENCOUNTER — APPOINTMENT (OUTPATIENT)
Dept: LAB | Facility: CLINIC | Age: 50
End: 2020-03-04

## 2020-03-04 DIAGNOSIS — E66.3 OVERWEIGHT (BMI 25.0-29.9): ICD-10-CM

## 2020-03-04 DIAGNOSIS — Z95.2 H/O MITRAL VALVE REPLACEMENT WITH MECHANICAL VALVE: ICD-10-CM

## 2020-03-04 DIAGNOSIS — E78.49 OTHER HYPERLIPIDEMIA: ICD-10-CM

## 2020-03-04 LAB
INR PPP: 2.47 (ref 0.84–1.19)
PROTHROMBIN TIME: 25.8 SECONDS (ref 11.6–14.5)
TSH SERPL DL<=0.05 MIU/L-ACNC: 1.94 UIU/ML (ref 0.36–3.74)

## 2020-03-04 PROCEDURE — 36415 COLL VENOUS BLD VENIPUNCTURE: CPT

## 2020-03-04 PROCEDURE — 84443 ASSAY THYROID STIM HORMONE: CPT

## 2020-03-04 PROCEDURE — 85610 PROTHROMBIN TIME: CPT

## 2020-03-06 ENCOUNTER — OFFICE VISIT (OUTPATIENT)
Dept: FAMILY MEDICINE CLINIC | Facility: CLINIC | Age: 50
End: 2020-03-06

## 2020-03-06 VITALS — HEIGHT: 70 IN | WEIGHT: 192.8 LBS | BODY MASS INDEX: 27.6 KG/M2

## 2020-03-06 DIAGNOSIS — M99.00 SOMATIC DYSFUNCTION OF HEAD REGION: ICD-10-CM

## 2020-03-06 DIAGNOSIS — M99.02 SOMATIC DYSFUNCTION OF THORACIC REGION: ICD-10-CM

## 2020-03-06 DIAGNOSIS — Z95.2 H/O MITRAL VALVE REPLACEMENT WITH MECHANICAL VALVE: ICD-10-CM

## 2020-03-06 DIAGNOSIS — M99.01 SOMATIC DYSFUNCTION OF CERVICAL REGION: ICD-10-CM

## 2020-03-06 DIAGNOSIS — M99.03 SOMATIC DYSFUNCTION OF LUMBAR REGION: ICD-10-CM

## 2020-03-06 DIAGNOSIS — M54.2 NECK PAIN: Primary | ICD-10-CM

## 2020-03-06 PROCEDURE — 3008F BODY MASS INDEX DOCD: CPT | Performed by: FAMILY MEDICINE

## 2020-03-06 PROCEDURE — 3078F DIAST BP <80 MM HG: CPT | Performed by: FAMILY MEDICINE

## 2020-03-06 PROCEDURE — 3074F SYST BP LT 130 MM HG: CPT | Performed by: FAMILY MEDICINE

## 2020-03-06 PROCEDURE — 99213 OFFICE O/P EST LOW 20 MIN: CPT | Performed by: FAMILY MEDICINE

## 2020-03-06 PROCEDURE — 4004F PT TOBACCO SCREEN RCVD TLK: CPT | Performed by: FAMILY MEDICINE

## 2020-03-06 NOTE — PROGRESS NOTES
Assessment/Plan     This is a 52 y o  female who presents for OMT follow-up for:  1  Neck pain      patient tolerated OMT well  She received great deal of benefit after initial visit  follow up in 2 weeks for further treatment     2  H/O mitral valve replacement with mechanical valve  Protime-INR   3  Somatic dysfunction of head region     4  Somatic dysfunction of cervical region     5  Somatic dysfunction of thoracic region     6  Somatic dysfunction of lumbar region         Plan:   1  Patient tolerated OMT well for the above problems,  advised patient to drink fluids and can use NSAID for soreness after treatment     2  OMT Follow up in 2 weeks  3  Subtherapeutic INR  · Patient currently taking coumadin 3mg 5 days a week and 4 5 mg Monday and Thursday  · Recent INR 2 47, goal 2 5-3 5  · Plan: increase coumadin 4 5mg m/w/f and 3mg s,t,r,s  · Repeat INR in 2 weeks  · Dr Charlene Mckeon cardiology previous cardiologist    Yi     Brigid Morin is a 52 y o  female and is here for a OMT follow up  The patient reports much improved neck pain and stiffness  She admitted occasional shooting pains down right arm  Cervical Xr was normal and symptoms likely due to nerve entrapment by muscle spasms  Patient might also benefit from EMG but due to lack of health insurance, she cannot currently afford that test  Patient continues to receive benefit from warm compresses, muscle relaxers, and icy hot roll-on  Is the patient taking Pain medication? no  Has the patient completed physical therapy for this condition? no  Did Patient symptoms improve from last OMT appointment?  yes    The following portions of the patient's history were reviewed and updated as appropriate: allergies, current medications, past family history, past medical history, past social history, past surgical history and problem list     Review of Systems  Do you have pain that bothers you in your daily life? yes  Review of Systems   Constitutional: Positive for fatigue  Negative for chills, diaphoresis and fever  Respiratory: Negative for cough, shortness of breath and wheezing  Cardiovascular: Negative for chest pain, palpitations and leg swelling  Musculoskeletal: Positive for neck pain and neck stiffness  Negative for arthralgias, back pain, gait problem, joint swelling and myalgias  Skin: Negative for color change, pallor and rash  Neurological: Positive for numbness  Negative for dizziness, tremors, seizures, syncope, facial asymmetry, speech difficulty, weakness, light-headedness and headaches         Objective     OMT Exam   Head:   Somatic Dysfunction:  Tissue Texture Changes, Asymmetry , Restriction and Tenderness  Severity :  1  Osteopathic Findings: OA tenderpoint, TMJ dysfunction  Treatment Method: ST, MFR and suture spread and OA release  Response: improved  Cervical:   Somatic Dysfunction:  Tissue Texture Changes, Asymmetry , Restriction and Tenderness  Severity :  2  Osteopathic Findings: hypertonic paraspinal muscles R>L  Treatment Method: ST and MFR  Response: improved  Thoracic T1-4:   Somatic Dysfunction:  Tissue Texture Changes, Asymmetry , Restriction and Tenderness  Severity :  1  Osteopathic Findings: hypertonic paraspinal muscles R>L, Rhomboid spasm on R  Treatment Method: ME and ST  Response: improved  Thoracic T5-9:   Somatic Dysfunction:  Tissue Texture Changes, Asymmetry , Restriction and Tenderness  Severity :  1  Osteopathic Findings: hypertonic paraspinal muscles R>L  Treatment Method: ME and ST  Response: improved  Thoracic T10-12:  Somatic Dysfunction:  Tissue Texture Changes, Asymmetry , Restriction and Tenderness  Severity :  1  Osteopathic Findings: hypertonic paraspinal muscles R>L  Treatment Method: ME and ST  Response: improved  Sacrum:  Somatic Dysfunction:  Restriction  Severity :  1  Osteopathic Findings: R on R sacral torsion  Treatment Method: ME and sacral rock  Response: improved

## 2020-03-09 ENCOUNTER — HOSPITAL ENCOUNTER (OUTPATIENT)
Dept: RADIOLOGY | Age: 50
Discharge: HOME | End: 2020-03-09
Payer: COMMERCIAL

## 2020-03-09 VITALS — WEIGHT: 192 LBS | HEIGHT: 70 IN | BODY MASS INDEX: 27.49 KG/M2

## 2020-03-09 DIAGNOSIS — Z12.39 BREAST CANCER SCREENING: ICD-10-CM

## 2020-03-09 PROCEDURE — 77067 SCR MAMMO BI INCL CAD: CPT

## 2020-03-09 PROCEDURE — 77063 BREAST TOMOSYNTHESIS BI: CPT

## 2020-03-10 ENCOUNTER — TELEPHONE (OUTPATIENT)
Dept: FAMILY MEDICINE CLINIC | Facility: CLINIC | Age: 50
End: 2020-03-10

## 2020-03-10 NOTE — TELEPHONE ENCOUNTER
----- Message from Romero Candelaria DO sent at 3/6/2020  4:49 PM EST -----  Regarding: past medical records  Good afternoon,     I believe patient signed medical records release at initial visit  Could you please have records faxed to our office and a copy left into my triage bin  Her cardiology records from Dr Jessee Pichardo are the ones I am most interested in  If you have any issues please let me know      Thanks,  [employer]

## 2020-03-10 NOTE — TELEPHONE ENCOUNTER
Spoke to patient and asked if she could please come to the office and sign a release,  There is none in the chart  Patient will come in tomorrow

## 2020-03-13 NOTE — TELEPHONE ENCOUNTER
Spoke to patient today who also came in after our call to sign 2 release forms         Linda Louis MD     [id number]     [place], [facility] [address]       [phone] (M)       [phone] (f)          [id number]       [phone] 40 [address] [place] [address] [phone] (E)        [phone] (f)     Both release forms were faxed

## 2020-03-16 ENCOUNTER — APPOINTMENT (OUTPATIENT)
Dept: LAB | Facility: CLINIC | Age: 50
End: 2020-03-16

## 2020-03-16 DIAGNOSIS — Z95.2 H/O MITRAL VALVE REPLACEMENT WITH MECHANICAL VALVE: ICD-10-CM

## 2020-03-16 LAB
INR PPP: 2.36 (ref 0.84–1.19)
PROTHROMBIN TIME: 24.9 SECONDS (ref 11.6–14.5)

## 2020-03-16 PROCEDURE — 85610 PROTHROMBIN TIME: CPT

## 2020-03-16 PROCEDURE — 36415 COLL VENOUS BLD VENIPUNCTURE: CPT

## 2020-03-17 ENCOUNTER — TELEPHONE (OUTPATIENT)
Dept: NEUROLOGY | Facility: CLINIC | Age: 50
End: 2020-03-17

## 2020-03-17 ENCOUNTER — TELEPHONE (OUTPATIENT)
Dept: FAMILY MEDICINE CLINIC | Facility: CLINIC | Age: 50
End: 2020-03-17

## 2020-03-17 DIAGNOSIS — Z95.2 H/O MITRAL VALVE REPLACEMENT WITH MECHANICAL VALVE: Primary | ICD-10-CM

## 2020-03-17 RX ORDER — WARFARIN SODIUM 3 MG/1
1.5 TABLET ORAL
Qty: 45 TABLET | Refills: 1 | Status: SHIPPED | OUTPATIENT
Start: 2020-03-17 | End: 2020-03-18 | Stop reason: SDUPTHER

## 2020-03-17 NOTE — TELEPHONE ENCOUNTER
Spoke with patient regarding recent INR result  Her previous INR was 2 47 so I increased her coumadin dosing to 4 5mg m/w/f and 3mg every other day  Most INR decreased to 2 36  Her Goal is 2 5-3 5 due to history of prosthetic valve  Increasing her to 4 5mg Monday-Friday with 3mg on sat and sun  Patient is here with her  who is a contractor and due to recent [address] pandemic, she is planning on moving back to [place] to live with her mother until after everything goes back to normal  She does have a walk in clinic where she can get her INR checked for 21$ but they are unable to treat her  I explained that if she calls us with the results we can continue treatment via phone until she returns  She is agreeable to this plan and will call in 2 weeks  Refill for coumadin sent to [facility] for repeat INR printed and will be mailed to patient so she knows when to get her INR checked next

## 2020-03-18 ENCOUNTER — TELEPHONE (OUTPATIENT)
Dept: FAMILY MEDICINE CLINIC | Facility: CLINIC | Age: 50
End: 2020-03-18

## 2020-03-18 DIAGNOSIS — Z95.2 H/O MITRAL VALVE REPLACEMENT WITH MECHANICAL VALVE: ICD-10-CM

## 2020-03-18 RX ORDER — WARFARIN SODIUM 3 MG/1
TABLET ORAL
Qty: 45 TABLET | Refills: 1 | Status: SHIPPED | OUTPATIENT
Start: 2020-03-18

## 2020-03-18 NOTE — TELEPHONE ENCOUNTER
Hugh Hopkins sent request for clarification of Warfarin dosing  Order states 0 5 but also says 1 5 tablets every 5 days  Please send new Rx with correct directions  Thanks!

## 2020-04-01 ENCOUNTER — PATIENT OUTREACH (OUTPATIENT)
Dept: FAMILY MEDICINE CLINIC | Facility: CLINIC | Age: 50
End: 2020-04-01

## 2020-05-29 ENCOUNTER — TELEPHONE (OUTPATIENT)
Dept: FAMILY MEDICINE CLINIC | Facility: CLINIC | Age: 50
End: 2020-05-29

## 2025-07-15 NOTE — TELEPHONE ENCOUNTER
Best contact number for patient:[phone]  Emergency Contact name and number:    Referring provider and telephone number:Adeel Warren    Primary Care Provider Name and if affiliated with [facility]: Dorinda East    Reason for Appointment/Dx:Numbness of face/Neck pain    Neurology Location patient would like to be seen:    Order received? Yes                                                 Records Received? No    Have you ever seen another Neurologist?       No    Insurance Information    Insurance Name:Self Pay     ID/Policy #:    Secondary Insurance:    ID/Policy#: Workman's Comp/ Accident/ School  Information      Workman's Comp/Accident/School related?        No    If yes name of Insurance company:    Date of Injury:    Type of Injury:    [address] Name and Telephone Number:    Notes:Eva Driscoll/tonya pt pack sent                   Appointment date:08/05/20 2:30pm Total contrast given (ml) 110